# Patient Record
Sex: MALE | Race: WHITE | NOT HISPANIC OR LATINO | Employment: STUDENT | URBAN - NONMETROPOLITAN AREA
[De-identification: names, ages, dates, MRNs, and addresses within clinical notes are randomized per-mention and may not be internally consistent; named-entity substitution may affect disease eponyms.]

---

## 2018-04-30 ENCOUNTER — OFFICE VISIT (OUTPATIENT)
Dept: ORTHOPEDIC SURGERY | Facility: CLINIC | Age: 23
End: 2018-04-30

## 2018-04-30 VITALS — WEIGHT: 195 LBS | HEIGHT: 66 IN | RESPIRATION RATE: 18 BRPM | BODY MASS INDEX: 31.34 KG/M2

## 2018-04-30 DIAGNOSIS — S89.92XA KNEE INJURY, LEFT, INITIAL ENCOUNTER: ICD-10-CM

## 2018-04-30 DIAGNOSIS — S83.207A MCMURRAY TEST POSITIVE, LEFT, INITIAL ENCOUNTER: Primary | ICD-10-CM

## 2018-04-30 DIAGNOSIS — S83.012A LATERAL SUBLUXATION OF LEFT PATELLA, INITIAL ENCOUNTER: ICD-10-CM

## 2018-04-30 PROCEDURE — 99203 OFFICE O/P NEW LOW 30 MIN: CPT | Performed by: PHYSICIAN ASSISTANT

## 2018-04-30 NOTE — PROGRESS NOTES
Subjective   Patient ID: Christopher Barroso is a 23 y.o. right hand dominant male  Pain of the Left Knee and Consult (Patient is being seen today at the request of Horace.)         History of Present Illness  Patient presents as a new patient with complaints of left knee injury.  He states around 3/18/2018 while spring break he was standing in sand when he stood up and torqued the left knee.  He states he immediately felt a pop to the left knee which caused temporary inability to straighten and flex the knee.  He states that has not improved.- he has used a knee brace since the date of injury, he has taken ibuprofen and visited the urgent treatment center where he had x-rays negative for acute fracture.he has also used ice and rested the knee.  Patient denies hip or ankle pain.    Pain Score: 3  Pain Location: Knee  Pain Orientation: Left     Pain Descriptors: Burning, Tingling, Sore (stiffness)  Pain Frequency: Intermittent           Aggravating Factors: Standing        Pain Intervention(s): Rest, Cold applied  Result of Injury: Yes (Spring break he was sitting in the sand and went to get up and something popped then the knee alot of burning)       History reviewed. No pertinent past medical history.     History reviewed. No pertinent surgical history.    Family History   Problem Relation Age of Onset   • No Known Problems Mother    • No Known Problems Father        Social History     Social History   • Marital status: Single     Spouse name: N/A   • Number of children: N/A   • Years of education: N/A     Occupational History   • Not on file.     Social History Main Topics   • Smoking status: Never Smoker   • Smokeless tobacco: Never Used   • Alcohol use No   • Drug use: No   • Sexual activity: Defer     Other Topics Concern   • Not on file     Social History Narrative   • No narrative on file       No current outpatient prescriptions on file.    No Known Allergies    Review of Systems   Constitutional: Negative for  "fever.   HENT: Negative for voice change.    Eyes: Negative for visual disturbance.   Respiratory: Negative for shortness of breath.    Cardiovascular: Negative for chest pain.   Gastrointestinal: Negative for abdominal distention and abdominal pain.   Genitourinary: Negative for dysuria.   Musculoskeletal: Positive for arthralgias. Negative for gait problem and joint swelling.   Skin: Negative for rash.   Neurological: Negative for speech difficulty.   Hematological: Does not bruise/bleed easily.   Psychiatric/Behavioral: Negative for confusion.       Objective   Resp 18   Ht 167.6 cm (66\")   Wt 88.5 kg (195 lb)   BMI 31.47 kg/m²    Physical Exam   Constitutional: He is oriented to person, place, and time. He appears well-nourished.   Eyes: Conjunctivae are normal.   Pulmonary/Chest: No respiratory distress.   Musculoskeletal:        Left knee: He exhibits decreased range of motion. He exhibits no swelling, no effusion, no ecchymosis, no deformity, no laceration and no erythema. Tenderness found. Lateral joint line and LCL tenderness noted.        Left ankle: No tenderness.   Neurological: He is alert and oriented to person, place, and time.   Skin: Capillary refill takes less than 2 seconds.   Psychiatric: He has a normal mood and affect. His behavior is normal.   Vitals reviewed.    Left Knee Exam     Range of Motion   Left knee extension: 3.   Flexion: 110     Tests   Haydee:  Medial - negative Lateral - positive  Drawer:       Anterior - negative       Varus: negative  Valgus: negative  Pivot Shift: trace  Patellar Apprehension: trace    Other   Erythema: absent  Sensation: normal  Pulse: present  Effusion: no effusion present           Extremity DVT signs are Negative by clinical screen.   Neurologic Exam     Mental Status   Oriented to person, place, and time.      Left Knee Exam     Tenderness   The patient is experiencing tenderness in the lateral joint line, LCL.               Assessment/Plan "   Independent Review of Radiographic Studies:    Shows no acute fracture or dislocation.      Procedures       Christopher was seen today for consult and pain.    Diagnoses and all orders for this visit:    Haydee test positive, left, initial encounter  -     MRI Knee Left Without Contrast    Knee injury, left, initial encounter  -     MRI Knee Left Without Contrast    Lateral subluxation of left patella, initial encounter  -     MRI Knee Left Without Contrast       Orthopedic activities reviewed and patient expressed appreciation  Discussion of orthopedic goals  Risk, benefits, and merits of treatment alternatives reviewed with the patient and questions answered  Weight bearing parameters reviewed  Avoid offending activity  Ice, heat, and/or modalities as beneficial    Recommendations/Plan:  Exercise, medications, injections, other patient advice, and return appointment as noted.  Patient is encouraged to call or return for any issues or concerns.    FU after MRI     Patient agreeable to call or return sooner for any concerns.

## 2018-05-02 ENCOUNTER — HOSPITAL ENCOUNTER (OUTPATIENT)
Dept: MRI IMAGING | Facility: HOSPITAL | Age: 23
Discharge: HOME | End: 2018-05-02
Admitting: PHYSICIAN ASSISTANT

## 2018-05-02 PROCEDURE — 73721 MRI JNT OF LWR EXTRE W/O DYE: CPT

## 2018-05-10 ENCOUNTER — OFFICE VISIT (OUTPATIENT)
Dept: ORTHOPEDIC SURGERY | Facility: CLINIC | Age: 23
End: 2018-05-10

## 2018-05-10 VITALS — RESPIRATION RATE: 16 BRPM | HEIGHT: 66 IN | BODY MASS INDEX: 31.82 KG/M2 | WEIGHT: 198 LBS

## 2018-05-10 DIAGNOSIS — Z09 FOLLOW UP: Primary | ICD-10-CM

## 2018-05-10 DIAGNOSIS — S83.92XD SPRAIN OF LEFT KNEE, UNSPECIFIED LIGAMENT, SUBSEQUENT ENCOUNTER: ICD-10-CM

## 2018-05-10 DIAGNOSIS — M22.8X2 PATELLAR MALTRACKING, LEFT: ICD-10-CM

## 2018-05-10 PROCEDURE — 99213 OFFICE O/P EST LOW 20 MIN: CPT | Performed by: PHYSICIAN ASSISTANT

## 2018-05-10 NOTE — PROGRESS NOTES
Subjective   Patient ID: Christopher Barroso is a 23 y.o.  male  Follow-up of the Left Knee (Haydee test positive, left,/Knee injury, left, /Lateral subluxation of left patella. Right hand dominant.) and Results (MRI left knee)         History of Present Illness    Patient is here to review MRI results of the left knee.  He states his left knee is feeling much better.  He denies knee instability.  He denies redness or warmth to the knee.  Patient initially injured his knee on 3/18/2018 while on spring break he was standing in the sand when he stood up and torqued his left knee.  He felt a painful pop.      Pain Score: 2  Pain Location: Knee  Pain Orientation: Left     Pain Descriptors: Aching  Pain Frequency: Intermittent           Aggravating Factors: Walking, Stairs, Standing, Bending           Result of Injury: Yes       History reviewed. No pertinent past medical history.     No past surgical history on file.    Family History   Problem Relation Age of Onset   • No Known Problems Mother    • No Known Problems Father        Social History     Social History   • Marital status: Single     Spouse name: N/A   • Number of children: N/A   • Years of education: N/A     Occupational History   • Not on file.     Social History Main Topics   • Smoking status: Never Smoker   • Smokeless tobacco: Never Used   • Alcohol use No   • Drug use: No   • Sexual activity: Defer     Other Topics Concern   • Not on file     Social History Narrative   • No narrative on file       No current outpatient prescriptions on file.    No Known Allergies    Review of Systems   Constitutional: Negative for fever.   HENT: Negative for voice change.    Eyes: Negative for visual disturbance.   Respiratory: Negative for shortness of breath.    Cardiovascular: Negative for chest pain.   Gastrointestinal: Negative for abdominal distention and abdominal pain.   Genitourinary: Negative for dysuria.   Musculoskeletal: Positive for arthralgias. Negative for  "gait problem and joint swelling.   Skin: Negative for rash.   Neurological: Negative for speech difficulty.   Hematological: Does not bruise/bleed easily.   Psychiatric/Behavioral: Negative for confusion.   All other systems reviewed and are negative.      Objective   Resp 16   Ht 167.6 cm (65.98\")   Wt 89.8 kg (198 lb)   BMI 31.97 kg/m²    Physical Exam   Constitutional: He is oriented to person, place, and time. He appears well-nourished.   Pulmonary/Chest: No respiratory distress.   Musculoskeletal:        Left knee: He exhibits normal range of motion, no swelling, no effusion, no ecchymosis, no deformity and no erythema.   Neurological: He is alert and oriented to person, place, and time.   Skin: Capillary refill takes less than 2 seconds.   Vitals reviewed.    Left Knee Exam     Tenderness   The patient is experiencing tenderness in the lateral retinaculum.    Range of Motion   Left knee extension: 0.   Flexion: 120     Tests   Haydee:  Medial - negative Lateral - negative  Drawer:       Anterior - negative     Posterior - negative  Varus: negative  Valgus: negative  Pivot Shift: negative  Patellar Apprehension: 1+    Other   Erythema: absent  Sensation: normal  Pulse: present  Effusion: no effusion present           Extremity DVT signs are Negative on physical exam with negative Kelli sign, with no calf pain, with no palpable cords, with no increased pain with passive stretch/extension and with no skin tone change   Neurologic Exam     Mental Status   Oriented to person, place, and time.      Ortho Exam     + Left patella crepitus      Assessment/Plan   Independent Review of Radiographic Studies:    Shows no acute fracture or dislocation.  MRI of the left knee is unremarkable.    Procedures       Christopher was seen today for results and follow-up.    Diagnoses and all orders for this visit:    Follow up  -     XR Knee 1 or 2 View Left    Sprain of left knee, unspecified ligament, subsequent encounter  -     " Ambulatory Referral to Physical Therapy Evaluate and treat (educate on HEP - patient is leaving to [place] for summer months), Ortho    Patellar maltracking, left  -     Ambulatory Referral to Physical Therapy Evaluate and treat (educate on HEP - patient is leaving to [place] for summer months), Ortho       Orthopedic activities reviewed and patient expressed appreciation  Discussion of orthopedic goals  Risk, benefits, and merits of treatment alternatives reviewed with the patient and questions answered  Physical therapy referral given    Recommendations/Plan:  Exercise, medications, injections, other patient advice, and return appointment as noted.  Patient is encouraged to call or return for any issues or concerns.    The patient to follow-up in the next 3 months.  He states he will be in [place] for 10 weeks for  summer break  Patient agreeable to call or return sooner for any concerns.